# Patient Record
Sex: MALE | URBAN - METROPOLITAN AREA
[De-identification: names, ages, dates, MRNs, and addresses within clinical notes are randomized per-mention and may not be internally consistent; named-entity substitution may affect disease eponyms.]

---

## 2017-06-23 ENCOUNTER — IMPORTED ENCOUNTER (OUTPATIENT)
Dept: URBAN - METROPOLITAN AREA CLINIC 1 | Facility: CLINIC | Age: 62
End: 2017-06-23

## 2017-06-23 PROBLEM — H40.023: Noted: 2017-06-23

## 2017-06-23 PROBLEM — Z79.84: Noted: 2017-06-23

## 2017-06-23 PROBLEM — H25.813: Noted: 2017-06-23

## 2017-06-23 PROBLEM — E11.9: Noted: 2017-06-23

## 2017-06-23 PROCEDURE — 92133 CPTRZD OPH DX IMG PST SGM ON: CPT

## 2017-06-23 PROCEDURE — 92015 DETERMINE REFRACTIVE STATE: CPT

## 2017-06-23 PROCEDURE — 92014 COMPRE OPH EXAM EST PT 1/>: CPT

## 2017-06-23 NOTE — PATIENT DISCUSSION
1.  DM Type II (Oral Medication) without sign of diabetic retinopathy and no blot heme on dilated retinal examination today OU No Macular Edema:  Discussed the pathophysiology of diabetes and its effect on the eye and risk of blindness. Stressed the importance of strong glucose control. Advised of importance of at least yearly dilated examinations but to contact us immediately for any problems or concerns. 2. Glaucoma Suspect OU (CD 0.70) : OCT shows slight progression OS still mild thinning. Patient is considered high risk. Condition was discussed with patient and patient understands. Will continue to monitor patient for any progression in condition. Patient was advised to call us with any problems questions or concerns. 3.  Cataract OU: Observe for now without intervention. The patient was advised to contact us if any change or worsening of visionMRX for glasses givenReturn for an appointment in 1 year 30/OCT/glare with Dr. Tanmay Yuen.

## 2018-06-22 ENCOUNTER — IMPORTED ENCOUNTER (OUTPATIENT)
Dept: URBAN - METROPOLITAN AREA CLINIC 1 | Facility: CLINIC | Age: 63
End: 2018-06-22

## 2018-06-22 PROBLEM — H40.023: Noted: 2018-06-22

## 2018-06-22 PROBLEM — H04.123: Noted: 2018-06-22

## 2018-06-22 PROBLEM — Z79.84: Noted: 2018-06-22

## 2018-06-22 PROBLEM — H25.813: Noted: 2018-06-22

## 2018-06-22 PROBLEM — E11.9: Noted: 2018-06-22

## 2018-06-22 PROCEDURE — 92014 COMPRE OPH EXAM EST PT 1/>: CPT

## 2018-06-22 PROCEDURE — 92015 DETERMINE REFRACTIVE STATE: CPT

## 2018-06-22 PROCEDURE — 92133 CPTRZD OPH DX IMG PST SGM ON: CPT

## 2018-06-22 NOTE — PATIENT DISCUSSION
1.  DM Type II (Oral Meds) without sign of diabetic retinopathy and no blot heme on dilated retinal examination today OU No Macular Edema:  Discussed the pathophysiology of diabetes and its effect on the eye and risk of blindness. Stressed the importance of strong glucose control. Advised of importance of at least yearly dilated examinations but to contact us immediately for any problems or concerns. 2. Glaucoma Suspect OU : (CD 0.7 OU) Fm Hx. AA. OCT shows minimal thinnning OD without significant progression OU. Continue to observe on no gtts. IOP stable OU today. Patient is considered high risk. 3.  Dry Eyes OU - Cont Ats BID OU Routinely. 4.  Cataract OU: Observe for now without intervention. The patient was advised to contact us if any change or worsening of vision5. Pinguecula OU- observe. Letter to PCP Return for an appointment in 1 yr 30 glare (no testing per PMG) with Dr. Kimmy Fisher.

## 2020-05-13 NOTE — PATIENT DISCUSSION
DERRICK wearing glasses for all distances which pt currently only wears for near activities, would help improve over all vision .

## 2020-11-24 NOTE — PATIENT DISCUSSION
Patient understands condition, prognosis and need for follow up care. Aware gls may not help and [facility] may decrease further if gls are made w/ the MR/spec rx given today.

## 2022-04-02 ASSESSMENT — VISUAL ACUITY
OS_CC: J1
OS_SC: 20/25
OD_SC: 20/25
OS_CC: J1
OD_SC: 20/20
OD_GLARE: 20/40
OS_GLARE: 20/40
OD_CC: J1
OD_CC: J1
OS_SC: 20/20

## 2022-04-02 ASSESSMENT — TONOMETRY
OS_IOP_MMHG: 15
OS_IOP_MMHG: 15
OD_IOP_MMHG: 15
OD_IOP_MMHG: 14

## 2024-09-30 ENCOUNTER — NEW PATIENT (OUTPATIENT)
Dept: URBAN - METROPOLITAN AREA CLINIC 1 | Facility: CLINIC | Age: 69
End: 2024-09-30

## 2024-09-30 DIAGNOSIS — H52.223: ICD-10-CM

## 2024-09-30 DIAGNOSIS — H52.4: ICD-10-CM

## 2024-09-30 DIAGNOSIS — H52.13: ICD-10-CM

## 2024-09-30 DIAGNOSIS — Z01.00: ICD-10-CM

## 2024-09-30 PROCEDURE — 92004 COMPRE OPH EXAM NEW PT 1/>: CPT

## 2024-09-30 PROCEDURE — 92015 DETERMINE REFRACTIVE STATE: CPT

## 2025-01-20 ENCOUNTER — COMPREHENSIVE EXAM (OUTPATIENT)
Age: 70
End: 2025-01-20

## 2025-01-20 DIAGNOSIS — H04.123: ICD-10-CM

## 2025-01-20 DIAGNOSIS — H25.813: ICD-10-CM

## 2025-01-20 DIAGNOSIS — E11.9: ICD-10-CM

## 2025-01-20 DIAGNOSIS — H40.023: ICD-10-CM

## 2025-01-20 PROCEDURE — 92133 CPTRZD OPH DX IMG PST SGM ON: CPT

## 2025-01-20 PROCEDURE — 99214 OFFICE O/P EST MOD 30 MIN: CPT

## 2025-08-01 ENCOUNTER — COMPREHENSIVE EXAM (OUTPATIENT)
Age: 70
End: 2025-08-01

## 2025-08-01 DIAGNOSIS — H52.223: ICD-10-CM

## 2025-08-01 DIAGNOSIS — H52.4: ICD-10-CM

## 2025-08-01 DIAGNOSIS — Z01.00: ICD-10-CM

## 2025-08-01 DIAGNOSIS — H52.13: ICD-10-CM

## 2025-08-01 PROCEDURE — 92015 DETERMINE REFRACTIVE STATE: CPT

## 2025-08-01 PROCEDURE — 92014 COMPRE OPH EXAM EST PT 1/>: CPT
